# Patient Record
Sex: FEMALE | Race: BLACK OR AFRICAN AMERICAN | NOT HISPANIC OR LATINO | URBAN - METROPOLITAN AREA
[De-identification: names, ages, dates, MRNs, and addresses within clinical notes are randomized per-mention and may not be internally consistent; named-entity substitution may affect disease eponyms.]

---

## 2017-04-11 ENCOUNTER — EMERGENCY (EMERGENCY)
Facility: HOSPITAL | Age: 72
LOS: 1 days | Discharge: LEFT BEFORE DISCHARGE | End: 2017-04-11
Admitting: EMERGENCY MEDICINE

## 2017-04-11 VITALS
SYSTOLIC BLOOD PRESSURE: 136 MMHG | RESPIRATION RATE: 16 BRPM | DIASTOLIC BLOOD PRESSURE: 82 MMHG | TEMPERATURE: 98 F | HEART RATE: 89 BPM | OXYGEN SATURATION: 99 %

## 2017-04-11 NOTE — ED ADULT NURSE NOTE - EXPLANATION OF PATIENT'S REASON FOR LEAVING
No reason in particular just felt like leaving. Didn't want to wait as per family member that was with her.

## 2017-10-28 ENCOUNTER — EMERGENCY (EMERGENCY)
Facility: HOSPITAL | Age: 72
LOS: 1 days | Discharge: HOME | End: 2017-10-28
Attending: EMERGENCY MEDICINE | Admitting: EMERGENCY MEDICINE
Payer: MEDICARE

## 2017-10-28 VITALS
RESPIRATION RATE: 18 BRPM | SYSTOLIC BLOOD PRESSURE: 144 MMHG | OXYGEN SATURATION: 98 % | DIASTOLIC BLOOD PRESSURE: 78 MMHG | HEART RATE: 86 BPM | TEMPERATURE: 98 F

## 2017-10-28 VITALS
OXYGEN SATURATION: 97 % | SYSTOLIC BLOOD PRESSURE: 157 MMHG | RESPIRATION RATE: 16 BRPM | DIASTOLIC BLOOD PRESSURE: 88 MMHG | HEART RATE: 85 BPM | TEMPERATURE: 98 F

## 2017-10-28 DIAGNOSIS — R20.0 ANESTHESIA OF SKIN: ICD-10-CM

## 2017-10-28 LAB
ALBUMIN SERPL ELPH-MCNC: 3.9 G/DL — SIGNIFICANT CHANGE UP (ref 3.3–5)
ALP SERPL-CCNC: 67 U/L — SIGNIFICANT CHANGE UP (ref 40–120)
ALT FLD-CCNC: 21 U/L — SIGNIFICANT CHANGE UP (ref 4–33)
AST SERPL-CCNC: 41 U/L — HIGH (ref 4–32)
BASOPHILS # BLD AUTO: 0.04 K/UL — SIGNIFICANT CHANGE UP (ref 0–0.2)
BASOPHILS NFR BLD AUTO: 0.4 % — SIGNIFICANT CHANGE UP (ref 0–2)
BILIRUB SERPL-MCNC: 0.3 MG/DL — SIGNIFICANT CHANGE UP (ref 0.2–1.2)
BUN SERPL-MCNC: 14 MG/DL — SIGNIFICANT CHANGE UP (ref 7–23)
CALCIUM SERPL-MCNC: 9.5 MG/DL — SIGNIFICANT CHANGE UP (ref 8.4–10.5)
CHLORIDE SERPL-SCNC: 98 MMOL/L — SIGNIFICANT CHANGE UP (ref 98–107)
CK SERPL-CCNC: 113 U/L — SIGNIFICANT CHANGE UP (ref 25–170)
CO2 SERPL-SCNC: 30 MMOL/L — SIGNIFICANT CHANGE UP (ref 22–31)
CREAT SERPL-MCNC: 1.05 MG/DL — SIGNIFICANT CHANGE UP (ref 0.5–1.3)
EOSINOPHIL # BLD AUTO: 0.28 K/UL — SIGNIFICANT CHANGE UP (ref 0–0.5)
EOSINOPHIL NFR BLD AUTO: 3.1 % — SIGNIFICANT CHANGE UP (ref 0–6)
GLUCOSE SERPL-MCNC: 104 MG/DL — HIGH (ref 70–99)
HCT VFR BLD CALC: 40 % — SIGNIFICANT CHANGE UP (ref 34.5–45)
HGB BLD-MCNC: 13.1 G/DL — SIGNIFICANT CHANGE UP (ref 11.5–15.5)
IMM GRANULOCYTES # BLD AUTO: 0.03 # — SIGNIFICANT CHANGE UP
IMM GRANULOCYTES NFR BLD AUTO: 0.3 % — SIGNIFICANT CHANGE UP (ref 0–1.5)
INR BLD: 0.93 — SIGNIFICANT CHANGE UP (ref 0.88–1.17)
LYMPHOCYTES # BLD AUTO: 2.34 K/UL — SIGNIFICANT CHANGE UP (ref 1–3.3)
LYMPHOCYTES # BLD AUTO: 26.1 % — SIGNIFICANT CHANGE UP (ref 13–44)
MAGNESIUM SERPL-MCNC: 2 MG/DL — SIGNIFICANT CHANGE UP (ref 1.6–2.6)
MCHC RBC-ENTMCNC: 28.5 PG — SIGNIFICANT CHANGE UP (ref 27–34)
MCHC RBC-ENTMCNC: 32.8 % — SIGNIFICANT CHANGE UP (ref 32–36)
MCV RBC AUTO: 87 FL — SIGNIFICANT CHANGE UP (ref 80–100)
MONOCYTES # BLD AUTO: 0.49 K/UL — SIGNIFICANT CHANGE UP (ref 0–0.9)
MONOCYTES NFR BLD AUTO: 5.5 % — SIGNIFICANT CHANGE UP (ref 2–14)
NEUTROPHILS # BLD AUTO: 5.8 K/UL — SIGNIFICANT CHANGE UP (ref 1.8–7.4)
NEUTROPHILS NFR BLD AUTO: 64.6 % — SIGNIFICANT CHANGE UP (ref 43–77)
NRBC # FLD: 0 — SIGNIFICANT CHANGE UP
PHOSPHATE SERPL-MCNC: 4.2 MG/DL — SIGNIFICANT CHANGE UP (ref 2.5–4.5)
PLATELET # BLD AUTO: 301 K/UL — SIGNIFICANT CHANGE UP (ref 150–400)
PMV BLD: 9.7 FL — SIGNIFICANT CHANGE UP (ref 7–13)
POTASSIUM SERPL-MCNC: SIGNIFICANT CHANGE UP MMOL/L (ref 3.5–5.3)
POTASSIUM SERPL-SCNC: SIGNIFICANT CHANGE UP MMOL/L (ref 3.5–5.3)
PROT SERPL-MCNC: 7.3 G/DL — SIGNIFICANT CHANGE UP (ref 6–8.3)
PROTHROM AB SERPL-ACNC: 10.4 SEC — SIGNIFICANT CHANGE UP (ref 9.8–13.1)
RBC # BLD: 4.6 M/UL — SIGNIFICANT CHANGE UP (ref 3.8–5.2)
RBC # FLD: 13.1 % — SIGNIFICANT CHANGE UP (ref 10.3–14.5)
SODIUM SERPL-SCNC: 140 MMOL/L — SIGNIFICANT CHANGE UP (ref 135–145)
TSH SERPL-MCNC: 0.7 UIU/ML — SIGNIFICANT CHANGE UP (ref 0.27–4.2)
WBC # BLD: 8.98 K/UL — SIGNIFICANT CHANGE UP (ref 3.8–10.5)
WBC # FLD AUTO: 8.98 K/UL — SIGNIFICANT CHANGE UP (ref 3.8–10.5)

## 2017-10-28 PROCEDURE — 71020: CPT | Mod: 26

## 2017-10-28 PROCEDURE — 99285 EMERGENCY DEPT VISIT HI MDM: CPT

## 2017-10-28 PROCEDURE — 93971 EXTREMITY STUDY: CPT | Mod: 26,LT
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
## 2017-10-28 NOTE — ED PROVIDER NOTE - NEUROLOGICAL, MLM
Alert and oriented, no pronator drift; 3-4/5 strength (B) lumbricals (abduction and adduction). Thenar wasting (B). 4/5 R  strength. No hyperreflexia.

## 2017-10-28 NOTE — ED ADULT TRIAGE NOTE - CHIEF COMPLAINT QUOTE
Multiple complaints. 1.  Pt c/o of numbness to right leg and bilateral hands x 4 months.  Pt saw pain management doctor who prescribed ointment containing Gabapentin 6%, Diclofenac 5%, Lidocaine 5%, and diclofenac, which patient has been apply x 4 months.  Pt endorses that she does not wash hands after applying medication on a regular basis.  2. Pt endorses "numbness and burning" to left rib cage area x 2 weeks

## 2017-10-28 NOTE — CONSULT NOTE ADULT - NSHPATTENDINGPLANDISCUSS_GEN_ALL_CORE
neurology resident on phone and I agree with above assessment and plan as outlined and outpatient follow up.

## 2017-10-28 NOTE — ED PROVIDER NOTE - ASSOCIATED SYMPTOMS
pain (B) feet. Swelling R foot; had US CLAUDETTE that was neg. PCP ordered MRI. Affecting ability to walk.

## 2017-10-28 NOTE — ED ADULT NURSE NOTE - OBJECTIVE STATEMENT
Pt presents to intake room 12, A&Ox3, ambulatory at baseline with a rolling walker, coming in for evaluation of multiple complaints.  Pt c/o pain/numbness/tingling to bilateral legs and left axillary area x several months.  Pt has a hx fo DM, HTN. pt seen by her doc and prescribed multiple creams/medications that have not worked.  Denies any chest pain, dizziness, nausea, vomiting, shortness of breath, fevers, or chills.  IV established in right ac with a 20g, labs drawn and sent, call bell in reach, side rails up, bed in locked position, md evaluation in progress, will continue to monitor.

## 2017-10-28 NOTE — ED PROVIDER NOTE - MUSCULOSKELETAL, MLM
Spine appears normal, range of motion is not limited, no muscle or joint tenderness. R foot swollen; not red/hot/tender.

## 2017-10-28 NOTE — CONSULT NOTE ADULT - ASSESSMENT
71 yo woman who presents to [facility] w/ b/l hand & right foot swelling & pain x 3 months since starting a compounded topical medication (gabapentin, lidocaine, diclofenac) for her diabetic neuropathic pain in her feet. Patient has not discussed the use of her medication w/ her symptoms w/ her private PM&R physician as of yet. Neurology consulted for further evaluation.

## 2017-10-28 NOTE — ED PROVIDER NOTE - OBJECTIVE STATEMENT
Timi: 72 F, p/w (B) hand pain and weakness x 2 weeks. Can't twist cap off bottle. No trauma/F. Also, (B) feet pain (being treated for DM neuropathy). Says hands became numb since she started massaging compounded gabapentin mixed w/ other pain meds by her Pain doctor into her feet.

## 2017-10-28 NOTE — ED PROVIDER NOTE - PLAN OF CARE
Stop the foot cream. Follow up with primary physician next week. May need orthopedic referral for pain, numbness and structural deformity in hands. Consider follow up for right foot swelling and numbness for peripheral arterial disease. Follow up with podiatrist.

## 2017-10-28 NOTE — CONSULT NOTE ADULT - PROBLEM SELECTOR RECOMMENDATION 9
of b/l hands and right foot 2/2 iatrogenic etiology w/ compounded cream in the setting of underlying diabetic neuropathy   -d/c use of cream   -u/s right lower extremity arterials & venous (including feet) (dopplers & soft tissue)  -consider oral anti-inflammatories such as indocin for pain control   -follow up w/ private PM&R for long term management   -if symptoms don't improve in 3-4 weeks, consider outpatient EMG studies for further evaluation.

## 2017-10-28 NOTE — ED PROVIDER NOTE - CARE PLAN
Principal Discharge DX:	Diabetic neuropathy Principal Discharge DX:	Diabetic neuropathy  Instructions for follow-up, activity and diet:	Stop the foot cream. Follow up with primary physician next week. May need orthopedic referral for pain, numbness and structural deformity in hands. Consider follow up for right foot swelling and numbness for peripheral arterial disease. Follow up with podiatrist.

## 2020-10-30 ENCOUNTER — EMERGENCY (EMERGENCY)
Facility: HOSPITAL | Age: 75
LOS: 1 days | Discharge: HOME | End: 2020-10-30
Attending: STUDENT IN AN ORGANIZED HEALTH CARE EDUCATION/TRAINING PROGRAM | Admitting: STUDENT IN AN ORGANIZED HEALTH CARE EDUCATION/TRAINING PROGRAM
Payer: MEDICARE

## 2020-10-30 VITALS
OXYGEN SATURATION: 100 % | RESPIRATION RATE: 16 BRPM | HEART RATE: 110 BPM | TEMPERATURE: 100 F | SYSTOLIC BLOOD PRESSURE: 167 MMHG | DIASTOLIC BLOOD PRESSURE: 104 MMHG

## 2020-10-30 VITALS
HEART RATE: 106 BPM | TEMPERATURE: 99 F | DIASTOLIC BLOOD PRESSURE: 73 MMHG | SYSTOLIC BLOOD PRESSURE: 152 MMHG | OXYGEN SATURATION: 96 % | RESPIRATION RATE: 17 BRPM

## 2020-10-30 LAB
ALBUMIN SERPL ELPH-MCNC: 3.9 G/DL — SIGNIFICANT CHANGE UP (ref 3.3–5)
ALP SERPL-CCNC: 84 U/L — SIGNIFICANT CHANGE UP (ref 40–120)
ALT FLD-CCNC: 15 U/L — SIGNIFICANT CHANGE UP (ref 4–33)
ANION GAP SERPL CALC-SCNC: 13 MMO/L — SIGNIFICANT CHANGE UP (ref 7–14)
AST SERPL-CCNC: 20 U/L — SIGNIFICANT CHANGE UP (ref 4–32)
BASOPHILS # BLD AUTO: 0.03 K/UL — SIGNIFICANT CHANGE UP (ref 0–0.2)
BASOPHILS NFR BLD AUTO: 0.2 % — SIGNIFICANT CHANGE UP (ref 0–2)
BILIRUB SERPL-MCNC: 0.8 MG/DL — SIGNIFICANT CHANGE UP (ref 0.2–1.2)
BUN SERPL-MCNC: 14 MG/DL — SIGNIFICANT CHANGE UP (ref 7–23)
CALCIUM SERPL-MCNC: 9.6 MG/DL — SIGNIFICANT CHANGE UP (ref 8.4–10.5)
CHLORIDE SERPL-SCNC: 97 MMOL/L — LOW (ref 98–107)
CO2 SERPL-SCNC: 29 MMOL/L — SIGNIFICANT CHANGE UP (ref 22–31)
CREAT SERPL-MCNC: 1.14 MG/DL — SIGNIFICANT CHANGE UP (ref 0.5–1.3)
EOSINOPHIL # BLD AUTO: 0.18 K/UL — SIGNIFICANT CHANGE UP (ref 0–0.5)
EOSINOPHIL NFR BLD AUTO: 1.4 % — SIGNIFICANT CHANGE UP (ref 0–6)
GLUCOSE SERPL-MCNC: 98 MG/DL — SIGNIFICANT CHANGE UP (ref 70–99)
HCT VFR BLD CALC: 42.6 % — SIGNIFICANT CHANGE UP (ref 34.5–45)
HGB BLD-MCNC: 13.3 G/DL — SIGNIFICANT CHANGE UP (ref 11.5–15.5)
IMM GRANULOCYTES NFR BLD AUTO: 0.3 % — SIGNIFICANT CHANGE UP (ref 0–1.5)
LYMPHOCYTES # BLD AUTO: 19.4 % — SIGNIFICANT CHANGE UP (ref 13–44)
LYMPHOCYTES # BLD AUTO: 2.47 K/UL — SIGNIFICANT CHANGE UP (ref 1–3.3)
MCHC RBC-ENTMCNC: 27.7 PG — SIGNIFICANT CHANGE UP (ref 27–34)
MCHC RBC-ENTMCNC: 31.2 % — LOW (ref 32–36)
MCV RBC AUTO: 88.6 FL — SIGNIFICANT CHANGE UP (ref 80–100)
MONOCYTES # BLD AUTO: 1 K/UL — HIGH (ref 0–0.9)
MONOCYTES NFR BLD AUTO: 7.9 % — SIGNIFICANT CHANGE UP (ref 2–14)
NEUTROPHILS # BLD AUTO: 8.98 K/UL — HIGH (ref 1.8–7.4)
NEUTROPHILS NFR BLD AUTO: 70.8 % — SIGNIFICANT CHANGE UP (ref 43–77)
NRBC # FLD: 0 K/UL — SIGNIFICANT CHANGE UP (ref 0–0)
PLATELET # BLD AUTO: 286 K/UL — SIGNIFICANT CHANGE UP (ref 150–400)
PMV BLD: 9.1 FL — SIGNIFICANT CHANGE UP (ref 7–13)
POTASSIUM SERPL-MCNC: 3.3 MMOL/L — LOW (ref 3.5–5.3)
POTASSIUM SERPL-SCNC: 3.3 MMOL/L — LOW (ref 3.5–5.3)
PROT SERPL-MCNC: 8 G/DL — SIGNIFICANT CHANGE UP (ref 6–8.3)
RBC # BLD: 4.81 M/UL — SIGNIFICANT CHANGE UP (ref 3.8–5.2)
RBC # FLD: 14 % — SIGNIFICANT CHANGE UP (ref 10.3–14.5)
SODIUM SERPL-SCNC: 139 MMOL/L — SIGNIFICANT CHANGE UP (ref 135–145)
WBC # BLD: 12.7 K/UL — HIGH (ref 3.8–10.5)
WBC # FLD AUTO: 12.7 K/UL — HIGH (ref 3.8–10.5)

## 2020-10-30 PROCEDURE — 99285 EMERGENCY DEPT VISIT HI MDM: CPT

## 2020-10-30 PROCEDURE — 73562 X-RAY EXAM OF KNEE 3: CPT | Mod: 26,LT

## 2020-10-30 PROCEDURE — 73610 X-RAY EXAM OF ANKLE: CPT | Mod: 26,76,LT

## 2020-10-30 PROCEDURE — 73590 X-RAY EXAM OF LOWER LEG: CPT | Mod: 26,LT

## 2020-10-30 PROCEDURE — 72170 X-RAY EXAM OF PELVIS: CPT | Mod: 26

## 2020-10-30 PROCEDURE — 93971 EXTREMITY STUDY: CPT | Mod: 26,LT

## 2020-10-30 RX ORDER — MAGNESIUM OXIDE 400 MG ORAL TABLET 241.3 MG
400 TABLET ORAL ONCE
Refills: 0 | Status: COMPLETED | OUTPATIENT
Start: 2020-10-30 | End: 2020-10-30

## 2020-10-30 RX ORDER — GABAPENTIN 400 MG/1
300 CAPSULE ORAL ONCE
Refills: 0 | Status: COMPLETED | OUTPATIENT
Start: 2020-10-30 | End: 2020-10-30

## 2020-10-30 RX ORDER — ACETAMINOPHEN 500 MG
650 TABLET ORAL ONCE
Refills: 0 | Status: COMPLETED | OUTPATIENT
Start: 2020-10-30 | End: 2020-10-30

## 2020-10-30 RX ORDER — MORPHINE SULFATE 50 MG/1
4 CAPSULE, EXTENDED RELEASE ORAL ONCE
Refills: 0 | Status: DISCONTINUED | OUTPATIENT
Start: 2020-10-30 | End: 2020-10-30

## 2020-10-30 RX ORDER — HYDROCHLOROTHIAZIDE 25 MG
25 TABLET ORAL ONCE
Refills: 0 | Status: COMPLETED | OUTPATIENT
Start: 2020-10-30 | End: 2020-10-30

## 2020-10-30 RX ORDER — POTASSIUM CHLORIDE 20 MEQ
40 PACKET (EA) ORAL ONCE
Refills: 0 | Status: COMPLETED | OUTPATIENT
Start: 2020-10-30 | End: 2020-10-30

## 2020-10-30 RX ADMIN — GABAPENTIN 300 MILLIGRAM(S): 400 CAPSULE ORAL at 18:38

## 2020-10-30 RX ADMIN — Medication 40 MILLIEQUIVALENT(S): at 21:50

## 2020-10-30 RX ADMIN — Medication 25 MILLIGRAM(S): at 19:15

## 2020-10-30 RX ADMIN — Medication 650 MILLIGRAM(S): at 18:38

## 2020-10-30 RX ADMIN — Medication 650 MILLIGRAM(S): at 19:15

## 2020-10-30 RX ADMIN — MAGNESIUM OXIDE 400 MG ORAL TABLET 400 MILLIGRAM(S): 241.3 TABLET ORAL at 21:50

## 2020-10-30 RX ADMIN — MORPHINE SULFATE 4 MILLIGRAM(S): 50 CAPSULE, EXTENDED RELEASE ORAL at 21:02

## 2020-10-30 NOTE — ED PROVIDER NOTE - ATTENDING CONTRIBUTION TO CARE
75F with pmh DM, HTN, HLD presenting with right leg pain since last night. States she was trying to get onto her scooter wheelchair when it rolled back, she twisted her right leg and fell onto her buttock. Denies head trauma or AC. Denies any other injury. States since, has been unable to stand on that leg due to ankle and knee pain. Also endorses swelling to the area. Denies fever, chills, cp, sob, nausea, vomiting, numbness     GEN: NAD, awake, well appearing  HEENT: NCAT, MMM, normal conjunctiva, perrl  CHEST/LUNGS: Non-tachypneic, CTAB, bilateral breath sounds  CARDIAC: Non-tachycardic, s1s2, normal perfusion, no peripheral edema  ABDOMEN: Soft, NTND, No rebound/guarding  MSK: right ankle and right knee with swelling and limited ROM 2/2 pain, ROM at hip intact b/l, neurovasc intact.   SKIN: No rashes, no petechiae, no vesicles  NEURO: CN grossly intact, normal coordination, no focal motor or sensory deficits  PSYCH: Alert, appropriate, cooperative     Patient presenting right leg pain after twisting manuever when she lost footing trying to get onto her scooter. DDx includes fracture, sprain. Will obtain screening imaging to assess.

## 2020-10-30 NOTE — ED PROVIDER NOTE - PHYSICAL EXAMINATION
A comprehensive trauma exam was perormed. No hematoma or skull tenderness or deformity, no tenderness or abnormality of facial bones. No spinous process or paraspinal muscle tenderness of the cervical, lumbar or thoracic spine. No rib tenderness/crepitus. Abdomen is soft, non-tender no guarding. Pelvis is stable. Mild tenderness to L ankle with effusion as well as tenderness to L knee.  No other traumatic abnormality on comprehensive exam.

## 2020-10-30 NOTE — CONSULT NOTE ADULT - SUBJECTIVE AND OBJECTIVE BOX
Orthopedic Surgery Consult Note    75yFemale PMH Dm, HTN, HLDp/w left ankle pain/deformity s/p mechanical fall out of her wheelchair. She reports being wheelchair bound since last year due to instability from her diabetic peripheral neuropathy. She fell out of her wheelchair onto the floor and her family helped her up. Denies headstrike/LOC. Reports numbness in bilateral legs below the knees to the toes. No other bone or joint complaints.                           13.3   12.70 )-----------( 286      ( 30 Oct 2020 18:35 )             42.6     30 Oct 2020 18:35    139    |  97     |  14     ----------------------------<  98     3.3     |  29     |  1.14     Ca    9.6        30 Oct 2020 18:35    TPro  8.0    /  Alb  3.9    /  TBili  0.8    /  DBili  x      /  AST  20     /  ALT  15     /  AlkPhos  84     30 Oct 2020 18:35      Vital Signs Last 24 Hrs  T(C): 36.8 (10-30-20 @ 20:34), Max: 37.6 (10-30-20 @ 17:21)  T(F): 98.2 (10-30-20 @ 20:34), Max: 99.6 (10-30-20 @ 17:21)  HR: 101 (10-30-20 @ 20:34) (100 - 110)  BP: 186/93 (10-30-20 @ 20:34) (167/104 - 206/89)  BP(mean): --  RR: 17 (10-30-20 @ 20:34) (16 - 18)  SpO2: 98% (10-30-20 @ 20:34) (98% - 100%)    Physical Exam  Gen: NAD  LLE: Skin intact, +skin tenting medially +TTP medial malleolus  limited ankle ROM due to pain  Left knee mild tenderness  motor intact distally  decreased sensation to light touch s/s/sp/dp/t which is baseline per patient  2+ DP  WWP distally    Imaging:  XR showing left medial malleolus ankle fracture    Procedure: Closed reduction performed followed by placement of a well padded trilam splint. Patient tolerated the procedure well. Post procedure imaging obtained and showed improved alignment. Post procedure the patient was NV intact.    A/P: 75yFemale with left ankle fracture s/p closed reduction and splinting    - Pain control  - NWB on affected extremity in splint  - Keep splint clean, dry and intact until follow up  Splint precautions:  Keep Splint dry  Elevate extremity, can try and ice through the Splint   Do not stick anything into the Splint    - Patient uses a wheelchair at baseline, can use cane/crutches/walker as needed  - Ice/elevation  - Follow up with Dr. Portillo in 1 week, call [phone] for appointment

## 2020-10-30 NOTE — ED PROVIDER NOTE - CLINICAL SUMMARY MEDICAL DECISION MAKING FREE TEXT BOX
76 Y/O F PMH DM HTN HLD states that she is unable to walk at baseline for the past year. Pt states she fell out of her wheelchair yesterday. Pt states her leg bent behind her during the fall.  Plan is home pain medication and BP medication, US to R/O DVT and labs to R/O anemia or electrolyte disturbance. Pt is well appearing, no acute distress.

## 2020-10-30 NOTE — ED ADULT NURSE NOTE - OBJECTIVE STATEMENT
Pt rec'd in 15, s/p left ankle injury yesterday, pt states her left leg twisted under her electric scooter.  Left ankle noted to be swollen, warm to touch, skin red. Pt reports pain from left knee down. Denies calf pain. No abrasions or bruising noted. Pt rec'd in 15, s/p left ankle injury yesterday, pt states her left leg twisted under her electric scooter and she fell down, denies head trauma.  Left ankle noted to be swollen, warm to touch, skin red. Pt reports pain from left knee down. Denies calf pain. No abrasions or bruising noted. Pt states she's non-ambulatory and in the electric scooter for the past year, states she just stopped walking one day and her doctor couldn't tell her why. Pt rec'd in 15, s/p left ankle injury yesterday, pt states her left leg twisted under her electric scooter and she fell down, denies head trauma.  Left ankle noted to be swollen, warm to touch, skin red. Pt reports pain from left knee down. Denies calf pain. No abrasions or bruising noted. Pt states she's non-ambulatory and in the electric scooter for the past year, states she just stopped walking one day and her doctor couldn't tell her why. Noted to be hypertensive on arrival, states she didn't take her blood pressure medication this morning.

## 2020-10-30 NOTE — ED PROVIDER NOTE - CARE PLAN
Principal Discharge DX:	Leg pain  Secondary Diagnosis:	Fall   Principal Discharge DX:	Ankle fracture, bimalleolar, closed, right, initial encounter  Secondary Diagnosis:	Fall

## 2020-10-30 NOTE — ED PROVIDER NOTE - OBJECTIVE STATEMENT
76 Y/O F PMH DM HTN HLD 74 Y/O F PMH DM HTN HLD states that she is unable to walk at baseline for the past year. Pt states she fell out of her wheelchair yesterday. Pt states her leg bent behind her during the fall. Pt states she was on the floor for 5 minutes until family helped her up. Pt states she has not taken her home Gabapentin, Tramadol or BP medication today. Pt denies any other symptoms or acute complaints.

## 2020-10-30 NOTE — ED PROVIDER NOTE - CARE PROVIDER_API CALL
Yarelis Portillo  ORTHOPAEDIC SURGERY  [address], [address]  [place] [address]  Phone: [phone]  Fax: [phone]  Follow Up Time: 7-10 Days

## 2020-10-30 NOTE — ED ADULT TRIAGE NOTE - CHIEF COMPLAINT QUOTE
c/o left leg pain with swelling s/p fall yesterday. Pt has electric scooter in home. Scooter moved before pt was ready on scooter. Pt slipped and leg twisted under scooter. Denies head trauma or LOC. FS=94

## 2020-10-30 NOTE — ED PROVIDER NOTE - NSFOLLOWUPINSTRUCTIONS_ED_ALL_ED_FT
You were seen in the emergency department for evaluation from a fall. Your x-rays demonstrated a fracture of your ankle. Do not put weight on your leg until cleared by orthopedics. You should follow up with Dr. Portillo in 1 week. Call [phone] to schedule the appointment.     Take Tylenol 650mg (Two 325 mg pills) every 4-6 hours as needed for pain.     Keep splint clean, dry and intact until follow up. Elevate extremity, can try and ice through the Splint.  Do not stick anything into the Splint. You can use cane/crutches/walker as needed. Apply Ice and elevate leg throughout the day.

## 2020-10-30 NOTE — ED PROVIDER NOTE - PATIENT PORTAL LINK FT
You can access the FollowMyHealth Patient Portal offered by [facility] by registering at the following website: [email or web address]. By joining [employer]’s FollowMyHealth portal, you will also be able to view your health information using other applications (apps) compatible with our system.

## 2020-10-30 NOTE — ED PROVIDER NOTE - CRANIAL NERVE AND PUPILLARY EXAM
cranial nerves 2-12 intact/5/5 strength and intact sensation bilaterally in upper and lower extremity

## 2020-10-30 NOTE — ED PROVIDER NOTE - PROGRESS NOTE DETAILS
DH: X-ray showing magdi fx w/ mortis disruption. Ortho paged. Jamie Argueta, PGY2: X-ray showing magdi fx w/ mortis disruption. Ortho paged. Jamie Argueta, PGY2: Ortho aware, will see patient in ED. Jamie Argueta, PGY2: Fracture reduced and splinted by ortho. Patient to follow up outpatient. Post-reduction XR showing good alignment. Discussed return precautions and all questions answered. Pt in agreement w/ plan. CAOx3, NAD, VSS. Stable for d/c.

## 2020-11-04 PROBLEM — E78.5 HYPERLIPIDEMIA, UNSPECIFIED: Chronic | Status: ACTIVE | Noted: 2020-10-30

## 2020-11-10 ENCOUNTER — APPOINTMENT (OUTPATIENT)
Dept: ORTHOPEDIC SURGERY | Facility: CLINIC | Age: 75
End: 2020-11-10
Payer: MEDICARE

## 2020-11-10 VITALS
WEIGHT: 170 LBS | HEART RATE: 90 BPM | BODY MASS INDEX: 33.38 KG/M2 | DIASTOLIC BLOOD PRESSURE: 98 MMHG | SYSTOLIC BLOOD PRESSURE: 172 MMHG | HEIGHT: 60 IN

## 2020-11-10 VITALS — TEMPERATURE: 97 F

## 2020-11-10 DIAGNOSIS — S82.392A OTHER FRACTURE OF LOWER END OF LEFT TIBIA, INITIAL ENCOUNTER FOR CLOSED FRACTURE: ICD-10-CM

## 2020-11-10 PROCEDURE — 99204 OFFICE O/P NEW MOD 45 MIN: CPT | Mod: 57

## 2020-11-10 PROCEDURE — 27824 TREAT LOWER LEG FRACTURE: CPT | Mod: LT

## 2020-11-10 PROCEDURE — 99072 ADDL SUPL MATRL&STAF TM PHE: CPT

## 2020-11-10 PROCEDURE — 73610 X-RAY EXAM OF ANKLE: CPT | Mod: LT

## 2020-12-15 ENCOUNTER — APPOINTMENT (OUTPATIENT)
Dept: ORTHOPEDIC SURGERY | Facility: CLINIC | Age: 75
End: 2020-12-15
Payer: MEDICARE

## 2020-12-15 VITALS
HEART RATE: 92 BPM | SYSTOLIC BLOOD PRESSURE: 135 MMHG | WEIGHT: 170 LBS | BODY MASS INDEX: 33.38 KG/M2 | DIASTOLIC BLOOD PRESSURE: 78 MMHG | HEIGHT: 60 IN | TEMPERATURE: 96.5 F

## 2020-12-15 PROCEDURE — 99024 POSTOP FOLLOW-UP VISIT: CPT

## 2020-12-15 PROCEDURE — 73610 X-RAY EXAM OF ANKLE: CPT | Mod: LT

## 2020-12-15 NOTE — DISCUSSION/SUMMARY
[de-identified] : The patient's fracture is healing but not healed enough to discontinue immobilization.  She will be maintained in the cam boot.  She will return in 3 weeks.

## 2020-12-15 NOTE — PHYSICAL EXAM
[Slightly Antalgic] : slightly antalgic [Wheelchair] : uses a wheelchair [LE] : Sensory: Intact in bilateral lower extremities [DP] : dorsalis pedis 2+ and symmetric bilaterally [1+] : left 1+ [Normal] : Alert and in no acute distress [Poor Appearance] : well-appearing [Acute Distress] : not in acute distress [Obese] : not obese [de-identified] : The patient has no respiratory distress. Mood and affect are normal. The patient is alert and oriented to person, place and time.\par The patient has pain with motion of the left knee.  The calves are nontender.  \par There is minimal swelling of the left ankle.  There is no tenderness.  She has dorsiflexion of 15 degrees and plantar flexion of 15 degrees.  The skin is intact.  There is no lymphedema.\par \par  [de-identified] : AP, lateral and mortise x-rays of the left ankle demonstrate healing of the medial malleolus fracture.

## 2020-12-15 NOTE — HISTORY OF PRESENT ILLNESS
[de-identified] : This 75-year-old woman presents 6-1/2 weeks following left medial malleolus fracture.  She is comfortable in the cam boot.

## 2021-01-05 ENCOUNTER — APPOINTMENT (OUTPATIENT)
Dept: ORTHOPEDIC SURGERY | Facility: CLINIC | Age: 76
End: 2021-01-05
Payer: MEDICARE

## 2021-01-05 VITALS
TEMPERATURE: 97.9 F | HEART RATE: 90 BPM | HEIGHT: 60 IN | DIASTOLIC BLOOD PRESSURE: 83 MMHG | BODY MASS INDEX: 33.38 KG/M2 | WEIGHT: 170 LBS | SYSTOLIC BLOOD PRESSURE: 143 MMHG

## 2021-01-05 PROCEDURE — 99024 POSTOP FOLLOW-UP VISIT: CPT

## 2021-01-05 PROCEDURE — 73610 X-RAY EXAM OF ANKLE: CPT | Mod: LT

## 2021-01-26 ENCOUNTER — APPOINTMENT (OUTPATIENT)
Dept: ORTHOPEDIC SURGERY | Facility: CLINIC | Age: 76
End: 2021-01-26
Payer: MEDICARE

## 2021-01-26 VITALS
DIASTOLIC BLOOD PRESSURE: 80 MMHG | SYSTOLIC BLOOD PRESSURE: 145 MMHG | TEMPERATURE: 93.7 F | HEIGHT: 60 IN | BODY MASS INDEX: 33.38 KG/M2 | HEART RATE: 78 BPM | WEIGHT: 170 LBS

## 2021-01-26 DIAGNOSIS — S82.392D OTHER FRACTURE OF LOWER END OF LEFT TIBIA, SUBSEQUENT ENCOUNTER FOR CLOSED FRACTURE WITH ROUTINE HEALING: ICD-10-CM

## 2021-01-26 PROCEDURE — 73610 X-RAY EXAM OF ANKLE: CPT | Mod: LT

## 2021-01-26 PROCEDURE — 99024 POSTOP FOLLOW-UP VISIT: CPT

## 2021-01-26 NOTE — PHYSICAL EXAM
[Slightly Antalgic] : slightly antalgic [Wheelchair] : uses a wheelchair [LE] : Sensory: Intact in bilateral lower extremities [DP] : dorsalis pedis 2+ and symmetric bilaterally [1+] : left 1+ [Normal] : Alert and in no acute distress [Poor Appearance] : well-appearing [Acute Distress] : not in acute distress [Obese] : not obese [de-identified] : The patient has no respiratory distress. Mood and affect are normal. The patient is alert and oriented to person, place and time.\par The patient has pain with motion of the left knee.  The calves are nontender.  \par There is minimal swelling of the left ankle.  There is no tenderness.  She has dorsiflexion of 15 degrees and plantar flexion of 15 degrees.   The skin is intact.\par  [de-identified] : AP, lateral and mortise x-rays of the left ankle demonstrate continued healing of the medial malleolus fracture.

## 2021-01-26 NOTE — DISCUSSION/SUMMARY
[de-identified] : The patient's fracture has healed.  She will discontinue use of the cam boot.  She may bear weight to tolerance in a comfortable shoe.  She is referred for physical therapy for range of motion, strengthening and balance training.  She will be reevaluated in 1 month.

## 2021-03-31 ENCOUNTER — EMERGENCY (EMERGENCY)
Facility: HOSPITAL | Age: 76
LOS: 1 days | Discharge: HOME | End: 2021-03-31
Attending: EMERGENCY MEDICINE | Admitting: EMERGENCY MEDICINE
Payer: MEDICARE

## 2021-03-31 VITALS
TEMPERATURE: 97 F | SYSTOLIC BLOOD PRESSURE: 216 MMHG | DIASTOLIC BLOOD PRESSURE: 99 MMHG | OXYGEN SATURATION: 100 % | RESPIRATION RATE: 16 BRPM | HEART RATE: 89 BPM

## 2021-03-31 VITALS
DIASTOLIC BLOOD PRESSURE: 95 MMHG | OXYGEN SATURATION: 100 % | SYSTOLIC BLOOD PRESSURE: 153 MMHG | RESPIRATION RATE: 16 BRPM | HEART RATE: 83 BPM

## 2021-03-31 LAB
A1C WITH ESTIMATED AVERAGE GLUCOSE RESULT: 8 % — HIGH (ref 4–5.6)
ALBUMIN SERPL ELPH-MCNC: 3.8 G/DL — SIGNIFICANT CHANGE UP (ref 3.3–5)
ALP SERPL-CCNC: 77 U/L — SIGNIFICANT CHANGE UP (ref 40–120)
ALT FLD-CCNC: 22 U/L — SIGNIFICANT CHANGE UP (ref 4–33)
ANION GAP SERPL CALC-SCNC: 13 MMOL/L — SIGNIFICANT CHANGE UP (ref 7–14)
AST SERPL-CCNC: 42 U/L — HIGH (ref 4–32)
BASOPHILS # BLD AUTO: 0.03 K/UL — SIGNIFICANT CHANGE UP (ref 0–0.2)
BASOPHILS NFR BLD AUTO: 0.4 % — SIGNIFICANT CHANGE UP (ref 0–2)
BILIRUB SERPL-MCNC: 0.5 MG/DL — SIGNIFICANT CHANGE UP (ref 0.2–1.2)
BUN SERPL-MCNC: 13 MG/DL — SIGNIFICANT CHANGE UP (ref 7–23)
CALCIUM SERPL-MCNC: 9.4 MG/DL — SIGNIFICANT CHANGE UP (ref 8.4–10.5)
CHLORIDE SERPL-SCNC: 98 MMOL/L — SIGNIFICANT CHANGE UP (ref 98–107)
CO2 SERPL-SCNC: 27 MMOL/L — SIGNIFICANT CHANGE UP (ref 22–31)
CREAT SERPL-MCNC: 1.02 MG/DL — SIGNIFICANT CHANGE UP (ref 0.5–1.3)
EOSINOPHIL # BLD AUTO: 0.2 K/UL — SIGNIFICANT CHANGE UP (ref 0–0.5)
EOSINOPHIL NFR BLD AUTO: 2.6 % — SIGNIFICANT CHANGE UP (ref 0–6)
ESTIMATED AVERAGE GLUCOSE: 183 MG/DL — HIGH (ref 68–114)
GLUCOSE SERPL-MCNC: 137 MG/DL — HIGH (ref 70–99)
HCT VFR BLD CALC: 40.9 % — SIGNIFICANT CHANGE UP (ref 34.5–45)
HGB BLD-MCNC: 13.2 G/DL — SIGNIFICANT CHANGE UP (ref 11.5–15.5)
IANC: 5.04 K/UL — SIGNIFICANT CHANGE UP (ref 1.5–8.5)
IMM GRANULOCYTES NFR BLD AUTO: 0.1 % — SIGNIFICANT CHANGE UP (ref 0–1.5)
LYMPHOCYTES # BLD AUTO: 2.09 K/UL — SIGNIFICANT CHANGE UP (ref 1–3.3)
LYMPHOCYTES # BLD AUTO: 26.8 % — SIGNIFICANT CHANGE UP (ref 13–44)
MAGNESIUM SERPL-MCNC: 1.7 MG/DL — SIGNIFICANT CHANGE UP (ref 1.6–2.6)
MCHC RBC-ENTMCNC: 27.8 PG — SIGNIFICANT CHANGE UP (ref 27–34)
MCHC RBC-ENTMCNC: 32.3 GM/DL — SIGNIFICANT CHANGE UP (ref 32–36)
MCV RBC AUTO: 86.3 FL — SIGNIFICANT CHANGE UP (ref 80–100)
MONOCYTES # BLD AUTO: 0.43 K/UL — SIGNIFICANT CHANGE UP (ref 0–0.9)
MONOCYTES NFR BLD AUTO: 5.5 % — SIGNIFICANT CHANGE UP (ref 2–14)
NEUTROPHILS # BLD AUTO: 5.04 K/UL — SIGNIFICANT CHANGE UP (ref 1.8–7.4)
NEUTROPHILS NFR BLD AUTO: 64.6 % — SIGNIFICANT CHANGE UP (ref 43–77)
NRBC # BLD: 0 /100 WBCS — SIGNIFICANT CHANGE UP
NRBC # FLD: 0 K/UL — SIGNIFICANT CHANGE UP
PLATELET # BLD AUTO: 316 K/UL — SIGNIFICANT CHANGE UP (ref 150–400)
POTASSIUM SERPL-MCNC: 4.2 MMOL/L — SIGNIFICANT CHANGE UP (ref 3.5–5.3)
POTASSIUM SERPL-SCNC: 4.2 MMOL/L — SIGNIFICANT CHANGE UP (ref 3.5–5.3)
PROT SERPL-MCNC: 7.6 G/DL — SIGNIFICANT CHANGE UP (ref 6–8.3)
RBC # BLD: 4.74 M/UL — SIGNIFICANT CHANGE UP (ref 3.8–5.2)
RBC # FLD: 14.8 % — HIGH (ref 10.3–14.5)
SODIUM SERPL-SCNC: 138 MMOL/L — SIGNIFICANT CHANGE UP (ref 135–145)
WBC # BLD: 7.8 K/UL — SIGNIFICANT CHANGE UP (ref 3.8–10.5)
WBC # FLD AUTO: 7.8 K/UL — SIGNIFICANT CHANGE UP (ref 3.8–10.5)

## 2021-03-31 PROCEDURE — 99284 EMERGENCY DEPT VISIT MOD MDM: CPT

## 2021-03-31 RX ORDER — HYDROCHLOROTHIAZIDE 25 MG
25 TABLET ORAL ONCE
Refills: 0 | Status: COMPLETED | OUTPATIENT
Start: 2021-03-31 | End: 2021-03-31

## 2021-03-31 RX ADMIN — Medication 25 MILLIGRAM(S): at 17:01

## 2021-03-31 NOTE — ED PROVIDER NOTE - NSFOLLOWUPINSTRUCTIONS_ED_ALL_ED_FT
1) Please follow up with primary care/Dermatology for further evaluation. Return to ER for worsening symptoms  2) Return to the ED immediately for new or worsening symptoms including chest pain, shortness of breath, nausea/vomiting, dizziness.  3) Please continue to take any home medications as prescribed. Take Tylenol 325 mg every 4 hours for pain relief/fever control or ibuprofen 600 mg every 6 hours for pain relief/fever control  4) Your test results from your ED visit were discussed with you prior to discharge  5) You were provided with a copy of your test results

## 2021-03-31 NOTE — ED PROVIDER NOTE - ATTENDING CONTRIBUTION TO CARE
Well appearing, no rash, +dry skin to b/l legs, advised derm f/u, labs WNL, BP came down with her oral home med, pt currently asyptomatic, d/c

## 2021-03-31 NOTE — ED ADULT NURSE NOTE - OBJECTIVE STATEMENT
Pt c/o insomnia , body itching after taking her insulin that appears to be .  Denies chest pain, sob, dizziness.  Patient to room 11. Patient being evaluated at this time by MD. Waiting for orders.  MARA Denny Pt c/o insomnia , body itching after taking her insulin that appears to be .  Denies chest pain, sob, dizziness.  Patient to room 11. Patient being evaluated at this time by MD. Waiting for orders.  MARA Denny  20 gauge IV lock placed to right antecubital and labs drawn and sent. MARA Denny

## 2021-03-31 NOTE — ED PROVIDER NOTE - NSFOLLOWUPCLINICS_GEN_ALL_ED_FT
[facility] - [place]  Dermatology  [address], [address]  [place] [address]  Phone: [phone]  Fax: [phone]  Follow Up Time: Routine

## 2021-03-31 NOTE — ED PROVIDER NOTE - PHYSICAL EXAMINATION
Vital signs reviewed.   CONSTITUTIONAL: Well-appearing; well-nourished; in no apparent distress. Non-toxic appearing.   HEAD: Normocephalic, atraumatic.  EYES: PERRL, EOM intact, conjunctiva and no sclera injection noted  ENT: normal nose; no rhinorrhea  NECK: No midline tenderness. FAROM  CARD: Normal S1, S2  RESP: Normal chest excursion with respiration; breath sounds clear and equal bilaterally  ABD/GI: soft, non-distended; non-tender  EXT/MS: moves all extremities; distal pulses are normal, minimal swelling to LT lower extremity.  SKIN: Normal for age and race; warm; dry; good turgor; no apparent lesions or exudate noted.  NEURO: Awake, alert, oriented x 3, no gross deficits, CN II-XII grossly intact, no motor or sensory deficit noted.  PSYCH: Normal mood; appropriate affect.

## 2021-03-31 NOTE — ED PROVIDER NOTE - PATIENT PORTAL LINK FT
You can access the FollowMyHealth Patient Portal offered by [facility] by registering at the following website: [email or web address]. By joining [employer]’s FollowMyHealth portal, you will also be able to view your health information using other applications (apps) compatible with our system.

## 2021-03-31 NOTE — ED PROVIDER NOTE - OBJECTIVE STATEMENT
75 Y/O F w/ PMH of IDDM and HTN presents to ER for itching throughout the body since last night. Admits to thinking  insulin  15 or 18 units. Experienced itching throughout body through the night. Has since resolved. No use of OTC medications/topical cream. No new medications/vitamins, bedding, clothing, soap/shampoo. Did not take home medications this morning. Last saw PMD Yosvany Quiroz last year. Denies fever, nausea, vomiting, dizziness, headache, chest pain, shortness of breath, palpitations, syncope. 75 Y/O F w/ PMH of IDDM and HTN presents to ER for itching throughout the body last night, now resolved. states she used some   insulin  15 or 18 units. Experienced itching throughout body through the night. Has since resolved. No use of OTC medications/topical cream. No new medications/vitamins, bedding, clothing, soap/shampoo. Did not take home medications this morning. Last saw PMD Yosvany Quiroz last year. Denies fever, nausea, vomiting, dizziness, headache, chest pain, shortness of breath, palpitations, syncope. Pt was supposed to get outpt labs today but by the time she had a ride to the lab they had closed so came to ED for blood work.

## 2021-03-31 NOTE — ED PROVIDER NOTE - CLINICAL SUMMARY MEDICAL DECISION MAKING FREE TEXT BOX
77 Y/O F w/ PMH of IDDM and HTN presents to ER for itching throughout the body since last night.  CBC   CMP assess renal function/electrolytes  A1C, referral to Endo/PMD  Derm follow up 75 Y/O F w/ PMH of IDDM and HTN presents to ER for itching throughout the body since last night. Pt also noted to be hypertensive in ED, did not take meds today, no cp/sob/palp/headache/blurry vision.    CBC   CMP assess renal function/electrolytes  A1C, referral to Endo/PMD  Will give home BP meds   Derm follow up

## 2021-03-31 NOTE — ED ADULT TRIAGE NOTE - CHIEF COMPLAINT QUOTE
Pt c/o insomnia , body itching after taking her insulin that appears to be .  Denies chest pain, sob, dizziness.

## 2021-03-31 NOTE — ED PROVIDER NOTE - NS ED ROS FT
Constitutional: (-) fever   Head: Normal cephalic, Atraumatic  Eyes/ENT: (-) vision changes  Cardiovascular: (-) chest pain, (-) wheezing  Respiratory: (-) cough, (-) shortness of breath  Gastrointestinal: (-) vomiting, (-) diarrhea, (-) abdominal pain  : (-) dysuria   Musculoskeletal: (-) back pain   Integumentary: (-) rash, (-) edema  Neurological: (+) itching  Allergic/Immunologic: (-) pruritus

## 2021-10-05 ENCOUNTER — APPOINTMENT (OUTPATIENT)
Dept: ORTHOPEDIC SURGERY | Facility: CLINIC | Age: 76
End: 2021-10-05

## 2021-11-16 NOTE — ED ADULT TRIAGE NOTE - CCCP TRG CHIEF CMPLNT
Impression: Combined forms of age-related cataract, right eye: H25.811. Plan: PLAN: OK to proceed with second cataract surgery after first eye cleared. Multiple complaints

## 2022-06-01 ENCOUNTER — LABORATORY RESULT (OUTPATIENT)
Age: 77
End: 2022-06-01

## 2022-06-01 ENCOUNTER — APPOINTMENT (OUTPATIENT)
Dept: INTERNAL MEDICINE | Facility: CLINIC | Age: 77
End: 2022-06-01
Payer: MEDICARE

## 2022-06-01 VITALS — SYSTOLIC BLOOD PRESSURE: 129 MMHG | HEART RATE: 92 BPM | DIASTOLIC BLOOD PRESSURE: 68 MMHG | TEMPERATURE: 97.8 F

## 2022-06-01 VITALS
RESPIRATION RATE: 16 BRPM | WEIGHT: 170 LBS | DIASTOLIC BLOOD PRESSURE: 68 MMHG | SYSTOLIC BLOOD PRESSURE: 129 MMHG | TEMPERATURE: 97.8 F | BODY MASS INDEX: 33.38 KG/M2 | HEIGHT: 60 IN | HEART RATE: 92 BPM

## 2022-06-01 DIAGNOSIS — E11.40 TYPE 2 DIABETES MELLITUS WITH DIABETIC NEUROPATHY, UNSPECIFIED: ICD-10-CM

## 2022-06-01 DIAGNOSIS — E11.21 TYPE 2 DIABETES MELLITUS WITH DIABETIC NEPHROPATHY: ICD-10-CM

## 2022-06-01 DIAGNOSIS — E11.9 TYPE 2 DIABETES MELLITUS W/OUT COMPLICATIONS: ICD-10-CM

## 2022-06-01 DIAGNOSIS — R26.9 UNSPECIFIED ABNORMALITIES OF GAIT AND MOBILITY: ICD-10-CM

## 2022-06-01 DIAGNOSIS — Z00.00 ENCOUNTER FOR GENERAL ADULT MEDICAL EXAMINATION W/OUT ABNORMAL FINDINGS: ICD-10-CM

## 2022-06-01 DIAGNOSIS — R29.898 OTHER SYMPTOMS AND SIGNS INVOLVING THE MUSCULOSKELETAL SYSTEM: ICD-10-CM

## 2022-06-01 LAB — HBA1C MFR BLD HPLC: 12.1

## 2022-06-01 PROCEDURE — 83036 HEMOGLOBIN GLYCOSYLATED A1C: CPT | Mod: QW

## 2022-06-01 PROCEDURE — G0439: CPT

## 2022-06-01 PROCEDURE — G0442 ANNUAL ALCOHOL SCREEN 15 MIN: CPT | Mod: 59

## 2022-06-01 RX ORDER — PEN NEEDLE, DIABETIC 32GX 5/32"
32G X 4 MM NEEDLE, DISPOSABLE MISCELLANEOUS
Qty: 100 | Refills: 0 | Status: ACTIVE | COMMUNITY
Start: 2022-01-31

## 2022-06-01 RX ORDER — TRAMADOL HYDROCHLORIDE 50 MG/1
50 TABLET, COATED ORAL
Qty: 60 | Refills: 0 | Status: DISCONTINUED | COMMUNITY
Start: 2022-04-22

## 2022-06-01 RX ORDER — TOLTERODINE TARTRATE 2 MG/1
2 CAPSULE, EXTENDED RELEASE ORAL
Qty: 90 | Refills: 0 | Status: ACTIVE | COMMUNITY
Start: 2022-04-27

## 2022-06-01 RX ORDER — HYDROCHLOROTHIAZIDE 25 MG/1
25 TABLET ORAL
Qty: 90 | Refills: 1 | Status: ACTIVE | COMMUNITY
Start: 2022-06-01

## 2022-06-01 RX ORDER — OLMESARTAN MEDOXOMIL 20 MG/1
20 TABLET, FILM COATED ORAL
Qty: 90 | Refills: 0 | Status: ACTIVE | COMMUNITY
Start: 2021-08-25

## 2022-06-01 RX ORDER — GABAPENTIN 300 MG/1
300 CAPSULE ORAL
Qty: 90 | Refills: 0 | Status: DISCONTINUED | COMMUNITY
Start: 2022-01-27

## 2022-06-01 RX ORDER — SULFAMETHOXAZOLE AND TRIMETHOPRIM 400; 80 MG/1; MG/1
400-80 TABLET ORAL
Qty: 14 | Refills: 0 | Status: DISCONTINUED | COMMUNITY
Start: 2022-04-26

## 2022-06-01 NOTE — HEALTH RISK ASSESSMENT
[Good] : ~his/her~  mood as  good [Never] : Never [No] : No [Patient reported mammogram was normal] : Patient reported mammogram was normal [Patient reported PAP Smear was normal] : Patient reported PAP Smear was normal [Patient reported bone density results were abnormal] : Patient reported bone density results were abnormal [Patient reported colonoscopy was normal] : Patient reported colonoscopy was normal [Alone] : lives alone [Retired] : retired [Some assistance needed] : feeding [Independent] : managing finances [Full assistance needed] : using transportation [Reports changes in vision] : Reports changes in vision [de-identified] : wheel chair bound  [Reports changes in hearing] : Reports no changes in hearing [Reports changes in dental health] : Reports no changes in dental health [MammogramDate] : 2020 [PapSmearDate] : 2021  [PapSmearComments] : saw last year  [BoneDensityDate] : 2017 [ColonoscopyDate] : 2019  [FreeTextEntry3] :

## 2022-06-01 NOTE — PHYSICAL EXAM
[No Acute Distress] : no acute distress [Well Nourished] : well nourished [Well Developed] : well developed [Well-Appearing] : well-appearing [Normal Sclera/Conjunctiva] : normal sclera/conjunctiva [PERRL] : pupils equal round and reactive to light [EOMI] : extraocular movements intact [Normal Outer Ear/Nose] : the outer ears and nose were normal in appearance [Normal Oropharynx] : the oropharynx was normal [No JVD] : no jugular venous distention [No Lymphadenopathy] : no lymphadenopathy [Supple] : supple [Thyroid Normal, No Nodules] : the thyroid was normal and there were no nodules present [No Respiratory Distress] : no respiratory distress  [No Accessory Muscle Use] : no accessory muscle use [Clear to Auscultation] : lungs were clear to auscultation bilaterally [Normal Rate] : normal rate  [Regular Rhythm] : with a regular rhythm [Normal S1, S2] : normal S1 and S2 [No Murmur] : no murmur heard [No Carotid Bruits] : no carotid bruits [No Abdominal Bruit] : a ~M bruit was not heard ~T in the abdomen [No Varicosities] : no varicosities [Pedal Pulses Present] : the pedal pulses are present [No Edema] : there was no peripheral edema [No Palpable Aorta] : no palpable aorta [No Extremity Clubbing/Cyanosis] : no extremity clubbing/cyanosis [Soft] : abdomen soft [Non Tender] : non-tender [Non-distended] : non-distended [No Masses] : no abdominal mass palpated [No HSM] : no HSM [Normal Bowel Sounds] : normal bowel sounds [Normal Posterior Cervical Nodes] : no posterior cervical lymphadenopathy [Normal Anterior Cervical Nodes] : no anterior cervical lymphadenopathy [No CVA Tenderness] : no CVA  tenderness [No Spinal Tenderness] : no spinal tenderness [No Joint Swelling] : no joint swelling [Grossly Normal Strength/Tone] : grossly normal strength/tone [No Rash] : no rash [Coordination Grossly Intact] : coordination grossly intact [No Focal Deficits] : no focal deficits [Normal Gait] : normal gait [Deep Tendon Reflexes (DTR)] : deep tendon reflexes were 2+ and symmetric [Normal Affect] : the affect was normal [Normal Insight/Judgement] : insight and judgment were intact [de-identified] : wheel chair dependent

## 2022-06-01 NOTE — REVIEW OF SYSTEMS
[Vision Problems] : vision problems [Joint Pain] : joint pain [Joint Stiffness] : joint stiffness [Muscle Weakness] : muscle weakness [Back Pain] : back pain [Negative] : Heme/Lymph

## 2022-06-01 NOTE — HISTORY OF PRESENT ILLNESS
[Other: _____] : [unfilled] [de-identified] : this is a 77 yrs old female seen as New pt to establish care \par - last check up was before covid 2020 - Dr Hannah Pruett - in [facility] \par \par HX DM -type 2 - did BW 3 months ago  4/2022 values were not good \par - Seeing Dr Cardenas nephrologist - cr 1.34 CKD \par AIC- 12.7 \par total chol 272 LDL- 177 - lovastatin 40 2 x daily since \par TSH-0.99\par cbc nlH-13.9 \par - on levemir flex pen 17 units qhs and Lispro insulin 17 units with meals  but only takes it in am - seeing endo Dr Nice \par -fasting sugars 374- 200 -250 etc night 110-140 at times \par -ophtho appt due 6/7 /22 - hx lazer rx to eye \par \par HX HTN- on olmesartan 20 qd , tolterodine 2 mg qd , HCTZ 25 qd \par \par Hx HDL-  lovastatin 40 2 x daily since \par \par Urine incontinence - was seeing urologist in past hx Frequent UTIs \par \par wheel chair dependent since 2017 - dx neuropathy in legs - did PT - feesl balance and gait issues \par hcx left ankle fracture 1/2021 followed by ortho last visit

## 2022-06-01 NOTE — ASSESSMENT
[FreeTextEntry1] : HX DM -type 2 - did BW 3 months ago  4/2022 values were not good -poc AIC 12.1 uncontrolled 6/1/22\par - was Seeing Dr Cardenas nephrologist - cr 1.34 CKD - wants to switch care - referral placed \par - endo evaluation \par AIC- 12.7 \par total chol 272 LDL- 177 - lovastatin 40 2 x daily since \par TSH-0.99\par cbc nlH-13.9 \par - change  levemir flex pen 20 units qhs and Lispro insulin 17 units in am and 17 units pm with meals 9 pt only eats 2 meals a day \par -fasting sugars 374- 200 -250 etc night 110-140 at times \par -ophtho appt due 6/7 /22 - hx lazer rx to eye \par -eat 1800 kcal ADA diet, cut  rice, pasta, sugar, sweet, soda, juices, loose weight .\par \par HTN-\par --no cp sob palpitation or dizzy spells , no leg swelling \par -continue current medications -on olmesartan 20 qd , tolterodine 2 mg qd , HCTZ 25 qd \par - educated low salt diet , avoid canned , processed and fast food ,chips , bagged items ,  start exercise daily 30- 40 minutes  , loose weight \par -f/u 4- 6 weeks check BP\par \par Hx HDL-  lovastatin 40 2 x daily since 4/2022 \par -check lipid panel \par -Avoid animal fat , red meat cheese , butter , red meat , start exercise daily 30 minutes , loose weight\par \par Urine incontinence - was seeing urologist in past hx Frequent UTIs \par \par wheel chair dependent since 2017 - dx neuropathy in legs - did PT - feesl balance and gait issues \par hcx left ankle fracture 1/2021 followed by ortho last visit \par PT referral given \par - neurology evaluation - denies any w/u in past \par \par Health maintenance \par Mammo- 2020 - referral placed \par colonoscope- 2019 as per pt records requested \par PAP-2021 saw gyn - does not remember - gyn referral given \par Bone density -2017  ordered \par HEP C screening-(educated pt CDC recommendation one time screening for patients born between 1945 -1965 )- ordered \par Dermatology consult advised -for skin ca screening \par Prevnar 20 advised - pt deferred today will let me know next visit \par Tdap-advised \par Shingrex -advised \par Covid vaccine -Pfizer - 3/3/21, 3/24/21, 12/15/21 \par \par records requested pt signed release \par HCP forms given to pt \par \par rtc 3 month s\par

## 2022-06-03 LAB
ALBUMIN SERPL ELPH-MCNC: 4.2 G/DL
ALP BLD-CCNC: 93 U/L
ALT SERPL-CCNC: 17 U/L
ANION GAP SERPL CALC-SCNC: 11 MMOL/L
AST SERPL-CCNC: 21 U/L
BILIRUB SERPL-MCNC: 0.4 MG/DL
BUN SERPL-MCNC: 18 MG/DL
CALCIUM SERPL-MCNC: 9.9 MG/DL
CHLORIDE SERPL-SCNC: 101 MMOL/L
CO2 SERPL-SCNC: 29 MMOL/L
CREAT SERPL-MCNC: 1.32 MG/DL
EGFR: 42 ML/MIN/1.73M2
GLUCOSE SERPL-MCNC: 190 MG/DL
POTASSIUM SERPL-SCNC: 4 MMOL/L
PROT SERPL-MCNC: 7 G/DL
SODIUM SERPL-SCNC: 141 MMOL/L

## 2022-06-06 RX ORDER — CIPROFLOXACIN HYDROCHLORIDE 250 MG/1
250 TABLET, FILM COATED ORAL
Qty: 6 | Refills: 0 | Status: COMPLETED | COMMUNITY
Start: 2022-06-06 | End: 2022-06-09

## 2022-06-07 LAB
25(OH)D3 SERPL-MCNC: 72.1 NG/ML
APPEARANCE: ABNORMAL
BASOPHILS # BLD AUTO: 0.03 K/UL
BASOPHILS NFR BLD AUTO: 0.3 %
BILIRUBIN URINE: NEGATIVE
BLOOD URINE: NEGATIVE
CHOLEST SERPL-MCNC: 191 MG/DL
COLOR: YELLOW
EOSINOPHIL # BLD AUTO: 0.29 K/UL
EOSINOPHIL NFR BLD AUTO: 3.1 %
GLUCOSE QUALITATIVE U: NORMAL
HCT VFR BLD CALC: 42.3 %
HCV AB SER QL: NONREACTIVE
HCV S/CO RATIO: 0.12 S/CO
HDLC SERPL-MCNC: 70 MG/DL
HGB BLD-MCNC: 13.3 G/DL
IMM GRANULOCYTES NFR BLD AUTO: 0.4 %
KETONES URINE: NORMAL
LDLC SERPL CALC-MCNC: 81 MG/DL
LEUKOCYTE ESTERASE URINE: ABNORMAL
LYMPHOCYTES # BLD AUTO: 2.24 K/UL
LYMPHOCYTES NFR BLD AUTO: 23.9 %
MAN DIFF?: NORMAL
MCHC RBC-ENTMCNC: 28.7 PG
MCHC RBC-ENTMCNC: 31.4 GM/DL
MCV RBC AUTO: 91.4 FL
MONOCYTES # BLD AUTO: 0.63 K/UL
MONOCYTES NFR BLD AUTO: 6.7 %
NEUTROPHILS # BLD AUTO: 6.15 K/UL
NEUTROPHILS NFR BLD AUTO: 65.6 %
NITRITE URINE: NEGATIVE
NONHDLC SERPL-MCNC: 121 MG/DL
PH URINE: 6.5
PLATELET # BLD AUTO: 300 K/UL
PROTEIN URINE: ABNORMAL
RBC # BLD: 4.63 M/UL
RBC # FLD: 14.2 %
SPECIFIC GRAVITY URINE: 1.03
TRIGL SERPL-MCNC: 202 MG/DL
TSH SERPL-ACNC: 0.38 UIU/ML
UROBILINOGEN URINE: NORMAL
VIT B12 SERPL-MCNC: 538 PG/ML
WBC # FLD AUTO: 9.38 K/UL

## 2022-08-03 DIAGNOSIS — E11.65 TYPE 2 DIABETES MELLITUS WITH HYPERGLYCEMIA: ICD-10-CM

## 2022-08-03 RX ORDER — INSULIN ASPART 100 [IU]/ML
100 INJECTION, SOLUTION INTRAVENOUS; SUBCUTANEOUS
Qty: 3 | Refills: 3 | Status: ACTIVE | COMMUNITY
Start: 2022-08-03 | End: 1900-01-01

## 2022-08-03 RX ORDER — FLASH GLUCOSE SENSOR
KIT MISCELLANEOUS
Qty: 6 | Refills: 1 | Status: ACTIVE | COMMUNITY
Start: 2022-05-05 | End: 1900-01-01

## 2022-08-03 RX ORDER — INSULIN DETEMIR 100 [IU]/ML
100 INJECTION, SOLUTION SUBCUTANEOUS
Qty: 3 | Refills: 0 | Status: ACTIVE | COMMUNITY
Start: 2021-09-13 | End: 1900-01-01

## 2022-08-03 RX ORDER — PEN NEEDLE, DIABETIC 29 G X1/2"
31G X 8 MM NEEDLE, DISPOSABLE MISCELLANEOUS
Qty: 200 | Refills: 0 | Status: COMPLETED | COMMUNITY
Start: 2022-01-27 | End: 2022-08-03

## 2022-08-03 RX ORDER — PEN NEEDLE, DIABETIC 29 G X1/2"
29G X 12.7MM NEEDLE, DISPOSABLE MISCELLANEOUS
Qty: 1 | Refills: 6 | Status: ACTIVE | COMMUNITY
Start: 2022-01-27 | End: 1900-01-01

## 2022-08-30 NOTE — ED PROVIDER NOTE - TOBACCO USE
A MY CHART MESSAGE HAS BEEN [place] TO THE [place] [facility] [place]       Unknown if ever smoked

## 2022-09-09 ENCOUNTER — APPOINTMENT (OUTPATIENT)
Dept: INTERNAL MEDICINE | Facility: CLINIC | Age: 77
End: 2022-09-09

## 2023-02-24 ENCOUNTER — NON-APPOINTMENT (OUTPATIENT)
Age: 78
End: 2023-02-24

## 2023-03-02 ENCOUNTER — NON-APPOINTMENT (OUTPATIENT)
Age: 78
End: 2023-03-02

## 2023-03-30 ENCOUNTER — APPOINTMENT (OUTPATIENT)
Dept: CARDIOLOGY | Facility: CLINIC | Age: 78
End: 2023-03-30
Payer: MEDICARE

## 2023-03-30 ENCOUNTER — NON-APPOINTMENT (OUTPATIENT)
Age: 78
End: 2023-03-30

## 2023-03-30 VITALS — SYSTOLIC BLOOD PRESSURE: 126 MMHG | DIASTOLIC BLOOD PRESSURE: 74 MMHG

## 2023-03-30 VITALS
HEART RATE: 92 BPM | DIASTOLIC BLOOD PRESSURE: 75 MMHG | BODY MASS INDEX: 33.38 KG/M2 | WEIGHT: 170 LBS | RESPIRATION RATE: 17 BRPM | SYSTOLIC BLOOD PRESSURE: 130 MMHG | OXYGEN SATURATION: 98 % | HEIGHT: 60 IN

## 2023-03-30 DIAGNOSIS — I10 ESSENTIAL (PRIMARY) HYPERTENSION: ICD-10-CM

## 2023-03-30 DIAGNOSIS — I45.10 UNSPECIFIED RIGHT BUNDLE-BRANCH BLOCK: ICD-10-CM

## 2023-03-30 DIAGNOSIS — E78.00 PURE HYPERCHOLESTEROLEMIA, UNSPECIFIED: ICD-10-CM

## 2023-03-30 PROCEDURE — 93000 ELECTROCARDIOGRAM COMPLETE: CPT

## 2023-03-30 PROCEDURE — 99204 OFFICE O/P NEW MOD 45 MIN: CPT

## 2023-04-04 NOTE — DISCUSSION/SUMMARY
[EKG obtained to assist in diagnosis and management of assessed problem(s)] : EKG obtained to assist in diagnosis and management of assessed problem(s) [FreeTextEntry1] : She is a 78-year-old with hypertension, hypercholesterolemia, diabetes mellitus with arm and leg weakness as well as newly diagnosed right bundle branch block and left anterior fascicular block.\par We reviewed the pathophysiology of these electrical anomalies and suggested an echocardiogram to exclude any structural heart disease though this is unlikely.\par We discussed the need to optimize her cardiovascular risk factors and she is working diligently on her diabetes management.  She should continue her statin dose and her LDL is already at goal.  She will continue daily aspirin, as she has enough risk factors for its maintenance.\par No further cardiac work-up is required unless any new symptoms arise.\par Follow-up in 6 months.\par

## 2023-04-04 NOTE — REVIEW OF SYSTEMS
[Feeling Fatigued] : feeling fatigued [Blurry Vision] : blurred vision [Dyspnea on exertion] : not dyspnea during exertion [Chest Discomfort] : no chest discomfort [Lower Ext Edema] : no extremity edema [Orthopnea] : no orthopnea [Negative] : ENT

## 2023-04-04 NOTE — PHYSICAL EXAM
[Normal Conjunctiva] : normal conjunctiva [Normal Venous Pressure] : normal venous pressure [Normal S1, S2] : normal S1, S2 [Clear Lung Fields] : clear lung fields [Good Air Entry] : good air entry [Normal] : no rash, no skin lesions [Soft] : abdomen soft [Moves all extremities] : moves all extremities [Alert and Oriented] : alert and oriented [de-identified] : woman in no acute distress [de-identified] : 1/6 systolic ejection murmur at the right upper sternal border. [de-identified] : She is in a wheelchair. [de-identified] : Somewhat weak and limited motion

## 2023-04-04 NOTE — HISTORY OF PRESENT ILLNESS
[FreeTextEntry1] : Thank you for referring her for cardiovascular evaluation.  She is a 78-year-old with a history of hypertension, diabetes mellitus, hypercholesterolemia and neuropathy (wheelchair dependent) who was noted to have an abnormal ECG.\par Of late she has been noticing diffuse body weakness and is using a wheelchair.\par She has no history of myocardial infarction, smoking or family history of premature coronary artery disease.\par She does not exert herself much and denies any chest pains or shortness of breath in bed.\par

## 2023-09-28 ENCOUNTER — APPOINTMENT (OUTPATIENT)
Dept: CARDIOLOGY | Facility: CLINIC | Age: 78
End: 2023-09-28

## 2024-11-12 NOTE — ED ADULT NURSE NOTE - NSIMPLEMENTINTERV_GEN_ALL_ED
oriented to person, place and time
Implemented All Fall Risk Interventions:  [place] to call system. Call bell, personal items and telephone within reach. Instruct patient to call for assistance. Room bathroom lighting operational. Non-slip footwear when patient is off stretcher. Physically safe environment: no spills, clutter or unnecessary equipment. Stretcher in lowest position, wheels locked, appropriate side rails in place. Provide visual cue, wrist band, yellow gown, etc. Monitor gait and stability. Monitor for mental status changes and reorient to person, place, and time. Review medications for side effects contributing to fall risk. Reinforce activity limits and safety measures with patient and family.

## 2025-06-05 NOTE — ED ADULT NURSE NOTE - CHIEF COMPLAINT QUOTE
Multiple complaints. 1.  Pt c/o of numbness to right leg and bilateral hands x 4 months.  Pt saw pain management doctor who prescribed ointment containing Gabapentin 6%, Diclofenac 5%, Lidocaine 5%, and diclofenac, which patient has been apply x 4 months.  Pt endorses that she does not wash hands after applying medication on a regular basis.  2. Pt endorses "numbness and burning" to left rib cage area x 2 weeks
yes...
Statement Selected